# Patient Record
Sex: MALE | Race: WHITE | NOT HISPANIC OR LATINO | URBAN - METROPOLITAN AREA
[De-identification: names, ages, dates, MRNs, and addresses within clinical notes are randomized per-mention and may not be internally consistent; named-entity substitution may affect disease eponyms.]

---

## 2020-09-04 ENCOUNTER — OFFICE VISIT (OUTPATIENT)
Dept: INTERNAL MEDICINE | Facility: CLINIC | Age: 40
End: 2020-09-04
Payer: COMMERCIAL

## 2020-09-04 ENCOUNTER — LAB VISIT (OUTPATIENT)
Dept: LAB | Facility: HOSPITAL | Age: 40
End: 2020-09-04
Attending: FAMILY MEDICINE
Payer: COMMERCIAL

## 2020-09-04 VITALS
TEMPERATURE: 97 F | BODY MASS INDEX: 29.75 KG/M2 | HEIGHT: 70 IN | SYSTOLIC BLOOD PRESSURE: 122 MMHG | DIASTOLIC BLOOD PRESSURE: 86 MMHG | HEART RATE: 91 BPM | RESPIRATION RATE: 18 BRPM | WEIGHT: 207.81 LBS | OXYGEN SATURATION: 97 %

## 2020-09-04 DIAGNOSIS — G56.03 BILATERAL CARPAL TUNNEL SYNDROME: Primary | ICD-10-CM

## 2020-09-04 DIAGNOSIS — R30.0 DYSURIA: ICD-10-CM

## 2020-09-04 DIAGNOSIS — R81 GLUCOSURIA: ICD-10-CM

## 2020-09-04 LAB
AMORPH CRY UR QL COMP ASSIST: NORMAL
BILIRUB SERPL-MCNC: ABNORMAL MG/DL
BILIRUB UR QL STRIP: NEGATIVE
BLOOD URINE, POC: ABNORMAL
CLARITY UR REFRACT.AUTO: ABNORMAL
CLARITY, POC UA: ABNORMAL
COLOR UR AUTO: YELLOW
COLOR, POC UA: ABNORMAL
GLUCOSE UR QL STRIP: 250
GLUCOSE UR QL STRIP: ABNORMAL
HGB UR QL STRIP: NEGATIVE
KETONES UR QL STRIP: ABNORMAL
KETONES UR QL STRIP: NEGATIVE
LEUKOCYTE ESTERASE UR QL STRIP: NEGATIVE
LEUKOCYTE ESTERASE URINE, POC: ABNORMAL
MICROSCOPIC COMMENT: NORMAL
NITRITE UR QL STRIP: NEGATIVE
NITRITE, POC UA: ABNORMAL
PH UR STRIP: 5 [PH] (ref 5–8)
PH, POC UA: 5
PROT UR QL STRIP: NEGATIVE
PROTEIN, POC: ABNORMAL
SP GR UR STRIP: 1.03 (ref 1–1.03)
SPECIFIC GRAVITY, POC UA: 1.03
URN SPEC COLLECT METH UR: ABNORMAL
UROBILINOGEN, POC UA: ABNORMAL

## 2020-09-04 PROCEDURE — 99204 PR OFFICE/OUTPT VISIT, NEW, LEVL IV, 45-59 MIN: ICD-10-PCS | Mod: 25,S$GLB,, | Performed by: FAMILY MEDICINE

## 2020-09-04 PROCEDURE — 81002 POCT URINE DIPSTICK WITHOUT MICROSCOPE: ICD-10-PCS | Mod: S$GLB,,, | Performed by: FAMILY MEDICINE

## 2020-09-04 PROCEDURE — 3008F BODY MASS INDEX DOCD: CPT | Mod: CPTII,S$GLB,, | Performed by: FAMILY MEDICINE

## 2020-09-04 PROCEDURE — 99204 OFFICE O/P NEW MOD 45 MIN: CPT | Mod: 25,S$GLB,, | Performed by: FAMILY MEDICINE

## 2020-09-04 PROCEDURE — 36415 COLL VENOUS BLD VENIPUNCTURE: CPT | Mod: PO

## 2020-09-04 PROCEDURE — 99999 PR PBB SHADOW E&M-NEW PATIENT-LVL IV: CPT | Mod: PBBFAC,,, | Performed by: FAMILY MEDICINE

## 2020-09-04 PROCEDURE — 81002 URINALYSIS NONAUTO W/O SCOPE: CPT | Mod: S$GLB,,, | Performed by: FAMILY MEDICINE

## 2020-09-04 PROCEDURE — 83036 HEMOGLOBIN GLYCOSYLATED A1C: CPT

## 2020-09-04 PROCEDURE — 80048 BASIC METABOLIC PNL TOTAL CA: CPT

## 2020-09-04 PROCEDURE — 87491 CHLMYD TRACH DNA AMP PROBE: CPT

## 2020-09-04 PROCEDURE — 3008F PR BODY MASS INDEX (BMI) DOCUMENTED: ICD-10-PCS | Mod: CPTII,S$GLB,, | Performed by: FAMILY MEDICINE

## 2020-09-04 PROCEDURE — 81001 URINALYSIS AUTO W/SCOPE: CPT

## 2020-09-04 PROCEDURE — 99999 PR PBB SHADOW E&M-NEW PATIENT-LVL IV: ICD-10-PCS | Mod: PBBFAC,,, | Performed by: FAMILY MEDICINE

## 2020-09-04 RX ORDER — LORATADINE 10 MG/1
10 TABLET ORAL NIGHTLY
COMMUNITY

## 2020-09-04 NOTE — PROGRESS NOTES
"Subjective:       Patient ID: Gonzalo Mendez is a 40 y.o. male.    Chief Complaint: Carpal Tunnel (marleny wrist ) and Cough    HPI   Here today complaining of bilateral wrist pain and numbness that sometimes wakes him up at night.  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  Has tried cranberry juice without much in no penile discharge.  Never had previous issues with urinary tract infect    History reviewed. No pertinent family history.    Current Outpatient Medications:     loratadine (CLARITIN) 10 mg tablet, Take 10 mg by mouth every evening., Disp: , Rfl:     Review of Systems   Constitutional: Negative for activity change and unexpected weight change.   HENT: Negative for hearing loss, rhinorrhea and trouble swallowing.    Eyes: Negative for discharge and visual disturbance.   Respiratory: Negative for chest tightness and wheezing.    Cardiovascular: Negative for chest pain and palpitations.   Gastrointestinal: Negative for blood in stool, constipation, diarrhea and vomiting.   Endocrine: Negative for polydipsia and polyuria.   Genitourinary: Positive for dysuria. Negative for difficulty urinating, hematuria and urgency.   Musculoskeletal: Positive for arthralgias and joint swelling. Negative for neck pain.   Neurological: Negative for weakness and headaches.   Psychiatric/Behavioral: Negative for confusion and dysphoric mood.       Objective:   /86 (BP Location: Left arm, Patient Position: Sitting, BP Method: X-Large (Manual))   Pulse 91   Temp 96.6 °F (35.9 °C) (Temporal)   Resp 18   Ht 5' 10" (1.778 m)   Wt 94.3 kg (207 lb 12.5 oz)   SpO2 97%   BMI 29.81 kg/m²      Physical Exam  Vitals signs reviewed.   Constitutional:       Appearance: He is well-developed.   HENT:      Head: Normocephalic and atraumatic.   Eyes:      Conjunctiva/sclera: Conjunctivae normal.   Cardiovascular:      Rate and " Rhythm: Normal rate.   Pulmonary:      Effort: Pulmonary effort is normal. No respiratory distress.   Musculoskeletal:      Comments: Positive Tinel's and reverse Phalen's.   Skin:     General: Skin is warm and dry.      Findings: No rash.   Neurological:      Mental Status: He is alert and oriented to person, place, and time.      Coordination: Coordination normal.   Psychiatric:         Behavior: Behavior normal.         Assessment & Plan     Problem List Items Addressed This Visit        Neuro    Bilateral carpal tunnel syndrome - Primary    Current Assessment & Plan     Due to severity of symptoms as well as positive Tinel's and reverse Phalen's will refer to orthopedics.         Relevant Orders    Ambulatory referral/consult to Orthopedics       Renal/    Dysuria    Current Assessment & Plan     Checking gonorrhea chlamydia and also sending for complete urinalysis         Relevant Orders    C. trachomatis/N. gonorrhoeae by AMP DNA    POCT URINE DIPSTICK WITHOUT MICROSCOPE (Completed)    URINALYSIS       Endocrine    Glucosuria    Current Assessment & Plan     Falling of urine dip with basic metabolic panel and A1c.         Relevant Orders    Basic metabolic panel    Hemoglobin A1C            No follow-ups on file.    Disclaimer:  This note may have been prepared using voice recognition software, it may have not been extensively proofed, as such there could be errors within the text such as sound alike errors.

## 2020-09-04 NOTE — ASSESSMENT & PLAN NOTE
Due to severity of symptoms as well as positive Tinel's and reverse Phalen's will refer to orthopedics.

## 2020-09-05 LAB
ANION GAP SERPL CALC-SCNC: 5 MMOL/L (ref 8–16)
BUN SERPL-MCNC: 15 MG/DL (ref 6–20)
CALCIUM SERPL-MCNC: 9.2 MG/DL (ref 8.7–10.5)
CHLORIDE SERPL-SCNC: 107 MMOL/L (ref 95–110)
CO2 SERPL-SCNC: 29 MMOL/L (ref 23–29)
CREAT SERPL-MCNC: 0.9 MG/DL (ref 0.5–1.4)
EST. GFR  (AFRICAN AMERICAN): >60 ML/MIN/1.73 M^2
EST. GFR  (NON AFRICAN AMERICAN): >60 ML/MIN/1.73 M^2
ESTIMATED AVG GLUCOSE: 111 MG/DL (ref 68–131)
GLUCOSE SERPL-MCNC: 100 MG/DL (ref 70–110)
HBA1C MFR BLD HPLC: 5.5 % (ref 4–5.6)
POTASSIUM SERPL-SCNC: 4.1 MMOL/L (ref 3.5–5.1)
SODIUM SERPL-SCNC: 141 MMOL/L (ref 136–145)

## 2020-09-11 DIAGNOSIS — G56.03 BILATERAL CARPAL TUNNEL SYNDROME: Primary | ICD-10-CM

## 2020-09-15 ENCOUNTER — OFFICE VISIT (OUTPATIENT)
Dept: ORTHOPEDICS | Facility: CLINIC | Age: 40
End: 2020-09-15
Payer: MEDICAID

## 2020-09-15 ENCOUNTER — HOSPITAL ENCOUNTER (OUTPATIENT)
Dept: RADIOLOGY | Facility: HOSPITAL | Age: 40
Discharge: HOME | End: 2020-09-15
Attending: FAMILY MEDICINE
Payer: COMMERCIAL

## 2020-09-15 DIAGNOSIS — M77.12 LEFT LATERAL EPICONDYLITIS: Primary | ICD-10-CM

## 2020-09-15 DIAGNOSIS — G56.03 BILATERAL CARPAL TUNNEL SYNDROME: ICD-10-CM

## 2020-09-15 PROCEDURE — 73130 X-RAY EXAM OF HAND: CPT | Mod: TC,50

## 2020-09-15 PROCEDURE — 20605 INTERMEDIATE JOINT ASPIRATION/INJECTION: ICD-10-PCS | Mod: S$PBB,LT,, | Performed by: FAMILY MEDICINE

## 2020-09-15 PROCEDURE — 73130 X-RAY EXAM OF HAND: CPT | Mod: 26,LT,, | Performed by: RADIOLOGY

## 2020-09-15 PROCEDURE — 20605 DRAIN/INJ JOINT/BURSA W/O US: CPT | Mod: PBBFAC | Performed by: FAMILY MEDICINE

## 2020-09-15 PROCEDURE — 99213 OFFICE O/P EST LOW 20 MIN: CPT | Mod: PBBFAC,25 | Performed by: FAMILY MEDICINE

## 2020-09-15 PROCEDURE — 73110 X-RAY EXAM OF WRIST: CPT | Mod: 26,RT,, | Performed by: RADIOLOGY

## 2020-09-15 PROCEDURE — 99214 OFFICE O/P EST MOD 30 MIN: CPT | Mod: 25,S$PBB,, | Performed by: FAMILY MEDICINE

## 2020-09-15 PROCEDURE — 99214 PR OFFICE/OUTPT VISIT, EST, LEVL IV, 30-39 MIN: ICD-10-PCS | Mod: 25,S$PBB,, | Performed by: FAMILY MEDICINE

## 2020-09-15 PROCEDURE — 73130 X-RAY EXAM OF HAND: CPT | Mod: 26,RT,, | Performed by: RADIOLOGY

## 2020-09-15 PROCEDURE — 73110 X-RAY EXAM OF WRIST: CPT | Mod: TC,50

## 2020-09-15 PROCEDURE — 73110 X-RAY EXAM OF WRIST: CPT | Mod: 26,LT,, | Performed by: RADIOLOGY

## 2020-09-15 PROCEDURE — 73110 PR  X-RAY WRIST 3+ VW: ICD-10-PCS | Mod: 26,LT,, | Performed by: RADIOLOGY

## 2020-09-15 PROCEDURE — 73130 PR  X-RAY HAND 3+ VW: ICD-10-PCS | Mod: 26,LT,, | Performed by: RADIOLOGY

## 2020-09-15 PROCEDURE — 99999 PR PBB SHADOW E&M-EST. PATIENT-LVL III: CPT | Mod: PBBFAC,,, | Performed by: FAMILY MEDICINE

## 2020-09-15 PROCEDURE — 99999 PR PBB SHADOW E&M-EST. PATIENT-LVL III: ICD-10-PCS | Mod: PBBFAC,,, | Performed by: FAMILY MEDICINE

## 2020-09-15 RX ORDER — METHYLPREDNISOLONE 4 MG/1
TABLET ORAL
Qty: 1 PACKAGE | Refills: 0 | Status: SHIPPED | OUTPATIENT
Start: 2020-09-15 | End: 2022-09-22

## 2020-09-15 RX ORDER — BETAMETHASONE SODIUM PHOSPHATE AND BETAMETHASONE ACETATE 3; 3 MG/ML; MG/ML
6 INJECTION, SUSPENSION INTRA-ARTICULAR; INTRALESIONAL; INTRAMUSCULAR; SOFT TISSUE
Status: DISCONTINUED | OUTPATIENT
Start: 2020-09-15 | End: 2020-09-15 | Stop reason: HOSPADM

## 2020-09-15 RX ADMIN — BETAMETHASONE ACETATE AND BETAMETHASONE SODIUM PHOSPHATE 6 MG: 3; 3 INJECTION, SUSPENSION INTRA-ARTICULAR; INTRALESIONAL; INTRAMUSCULAR; SOFT TISSUE at 03:09

## 2020-09-15 NOTE — PATIENT INSTRUCTIONS
Apply ice to affected area three times a day for (15) fifteen minutes for the next 48 hours, and reduce activity during that time.    Voltaren gel three times a day to affected area if recommended.    Oral medication if recommended.  Physical therapy if recommended at home or at clinic.  Keep recheck visit.      Nerve conduction study and then follow-up with Dr. Perea

## 2020-09-15 NOTE — PROCEDURES
L lateral epicondyleIntermediate Joint Aspiration/Injection    Date/Time: 9/15/2020 3:30 PM  Performed by: Chun Damian MD  Authorized by: Chun Damain MD     Indications: Pain  Site marked: The procedure site was marked    Timeout: Prior to procedure the correct patient, procedure, and site was verified      Location:  Elbow  Prep: Patient was prepped and draped in usual sterile fashion    Approach:  Anterolateral  Medications:  6 mg betamethasone acetate-betamethasone sodium phosphate 6 mg/mL  Patient tolerance:  Patient tolerated the procedure well with no immediate complications

## 2020-09-15 NOTE — LETTER
September 15, 2020      Baron Henson,   [address] [address] [address]           [facility]  [id number] [place] [address]  Phone: [phone]  Fax: [phone]          Patient: Gonzalo Mendez   MR Number: [id number]   YOB: 1980   Date of Visit: 9/15/2020       Dear Dr. Baron Henson:    Thank you for referring Gonzalo Mendez to me for evaluation. Attached you will find relevant portions of my assessment and plan of care.    If you have questions, please do not hesitate to call me. I look forward to following Gonzalo Mendez along with you.    Sincerely,    Chun Damian MD    Enclosure  CC:  No Recipients    If you would like to receive this communication electronically, please contact [email or web address] or [phone] to request more information on [facility] [employer] access.    For providers and/or their staff who would like to refer a patient to [facility], please contact us through our one-stop-shop provider referral line, [facility] , at [phone].    If you feel you have received this communication in error or would no longer like to receive these types of communications, please e-mail [email or web address]

## 2020-09-15 NOTE — PROGRESS NOTES
Subjective:     Patient ID: Gonzalo Mendez is a 40 y.o. male.    Chief Complaint: Pain and Numbness of the Right Wrist and Pain and Numbness of the Left Wrist      HPI:  Problem - pain and numbness into hands for several years in worse for the last few months.  Did weed eating 5 days a week for several years and thinks this may have started the problem.  Also has some pain around the left elbow region.    Duration - months    Severity -    Pain - 6 /10   Limitations - affects daily activities    Previous Treatment - Advil and Aleve but tries to keep this minimal since he took it for about 20 years.  Currently out of work.    Other Information - mother is diabetic and patient had glucosuria and asked me to check his results on his A1c and blood sugar and these were normal.  Has bilateral wrist splints and wears them p.r.n..  Would be willing to have surgical repair if needed for his wrist.      History reviewed. No pertinent past medical history.  History reviewed. No pertinent surgical history.  History reviewed. No pertinent family history.  Social History     Socioeconomic History    Marital status:      Spouse name: Not on file    Number of children: Not on file    Years of education: Not on file    Highest education level: Not on file   Occupational History    Not on file   Social Needs    Financial resource strain: Somewhat hard    Food insecurity     Worry: Never true     Inability: Never true    Transportation needs     Medical: No     Non-medical: No   Tobacco Use    Smoking status: Never Smoker    Smokeless tobacco: Current User     Types: Snuff   Substance and Sexual Activity    Alcohol use: Yes     Frequency: 2-4 times a month     Drinks per session: 1 or 2     Binge frequency: Never    Drug use: Never    Sexual activity: Yes   Lifestyle    Physical activity     Days per week: 5 days     Minutes per session: 30 min    Stress: Only a little   Relationships    Social  connections     Talks on phone: Three times a week     Gets together: Once a week     Attends Gnosticist service: Not on file     Active member of club or organization: No     Attends meetings of clubs or organizations: Never     Relationship status:    Other Topics Concern    Not on file   Social History Narrative    Not on file       Current Outpatient Medications:     loratadine (CLARITIN) 10 mg tablet, Take 10 mg by mouth every evening., Disp: , Rfl:   Review of patient's allergies indicates:  No Known Allergies  Review of Systems   Constitutional: Negative for chills, fever and weight loss.   Respiratory: Negative for shortness of breath.    Cardiovascular: Negative for chest pain and palpitations.       Objective:   There is no height or weight on file to calculate BMI.  There were no vitals filed for this visit.        Ortho/SPM Exam  General - A&O, NAD.     Respiratory Effort - Normal    Extremity (Body Part) - wrist and left elbow     -No acute deformity/swelling-trace edema left lateral elbow extensor mass    ROM - full range of motion of the wrist and left elbow    TTP - point tenderness to palpation at the left lateral epicondyle and worsened by resisted dorsiflexion of the wrist.  No tenderness to palpation of the wrist.    Stability -good, no excessive laxity  Strength - 4 to 5/5 bilateral    Neurovascular Intact - positive Tinel and Phalen test on the right  Negative Tinel and Phalen on left wrist    Note-with these maneuvers the patient complains of a burning type pain and numbness he feels in the entire hand.    Other -     Psychiatric - Affect & Cognition WNL      Plan for Improvement - cortisone injection today to left lateral epicondyle to help with tennis elbow symptoms.  Prednisone by mouth for few days to reduce inflammation hopefully around the median nerves and reduce is hand and wrist symptoms.    Will order nerve conduction studies and referral to hand ortho.      IMAGING: No  image results found.       Radiographs / Imaging : Relevant imaging results reviewed by me and interpreted by me, discussed with the patient and / or family -radiographs reviewed by me and agree with radiologist interpretation of no acute fracture dislocation and minimal degenerative changes.      Assessment:     Encounter Diagnoses   Name Primary?    Bilateral carpal tunnel syndrome     Left lateral epicondylitis Yes        Plan:   Left lateral epicondylitis  -     Intermediate Joint Aspiration/Injection    Bilateral carpal tunnel syndrome  -     Ambulatory referral/consult to Orthopedics  -     EMG W/ ULTRASOUND AND NERVE CONDUCTION TEST 2 Extremities; Future        The patient verbalized good understanding of the medical issues discussed today and expressed appreciation for the care provided.  Patient was given the opportunity to ask questions and be an active participant in their medical care. Patient had no further questions or concerns at this time.     The patient was encouraged to participate in appropriate physical activity.      Chun Damian M.D.  [facility]        This note was dictated using voice recognition software and may have sound a like errors.

## 2020-09-22 ENCOUNTER — PATIENT MESSAGE (OUTPATIENT)
Dept: ORTHOPEDICS | Facility: CLINIC | Age: 40
End: 2020-09-22

## 2020-09-22 ENCOUNTER — TELEPHONE (OUTPATIENT)
Dept: ORTHOPEDICS | Facility: CLINIC | Age: 40
End: 2020-09-22

## 2020-09-28 ENCOUNTER — PATIENT MESSAGE (OUTPATIENT)
Dept: ORTHOPEDICS | Facility: CLINIC | Age: 40
End: 2020-09-28

## 2020-10-12 ENCOUNTER — PATIENT MESSAGE (OUTPATIENT)
Dept: ORTHOPEDICS | Facility: CLINIC | Age: 40
End: 2020-10-12

## 2020-10-12 ENCOUNTER — TELEPHONE (OUTPATIENT)
Dept: ORTHOPEDICS | Facility: CLINIC | Age: 40
End: 2020-10-12

## 2021-02-19 ENCOUNTER — TELEPHONE (OUTPATIENT)
Dept: INTERNAL MEDICINE | Facility: CLINIC | Age: 41
End: 2021-02-19

## 2022-02-21 ENCOUNTER — TELEPHONE (OUTPATIENT)
Dept: INTERNAL MEDICINE | Facility: CLINIC | Age: 42
End: 2022-02-21
Payer: MEDICAID

## 2022-02-21 NOTE — TELEPHONE ENCOUNTER
----- Message from Gema Marroquin sent at 2/21/2022  4:36 PM CST -----  Regarding: pt spouse called  Name of Who is Calling: JIMMY ROBIN [[id number]] yani      What is the request in detail: pt is requesting an appt for 02/22/2022 for pain in his arm. Please advise      Can the clinic reply by [facility]: No      [facility] [phone]       Resident

## 2022-02-21 NOTE — TELEPHONE ENCOUNTER
Spoke with patients wife, Amparo.  She was looking for a primary doctor that could see her  tomorrow but accidentially call Central office.  Was looking for someone in the [facility].

## 2022-02-23 DIAGNOSIS — M79.602 LEFT ARM PAIN: Primary | ICD-10-CM

## 2022-03-03 ENCOUNTER — OFFICE VISIT (OUTPATIENT)
Dept: SPORTS MEDICINE | Facility: CLINIC | Age: 42
End: 2022-03-03
Payer: MEDICAID

## 2022-03-03 ENCOUNTER — HOSPITAL ENCOUNTER (OUTPATIENT)
Dept: RADIOLOGY | Facility: HOSPITAL | Age: 42
Discharge: HOME | End: 2022-03-03
Attending: FAMILY MEDICINE
Payer: MEDICAID

## 2022-03-03 VITALS — BODY MASS INDEX: 29.76 KG/M2 | WEIGHT: 207.88 LBS | HEIGHT: 70 IN

## 2022-03-03 DIAGNOSIS — M77.12 LEFT LATERAL EPICONDYLITIS: Primary | ICD-10-CM

## 2022-03-03 DIAGNOSIS — M79.602 LEFT ARM PAIN: ICD-10-CM

## 2022-03-03 PROCEDURE — 99214 PR OFFICE/OUTPT VISIT, EST, LEVL IV, 30-39 MIN: ICD-10-PCS | Mod: 25,S$PBB,, | Performed by: FAMILY MEDICINE

## 2022-03-03 PROCEDURE — 99999 PR PBB SHADOW E&M-EST. PATIENT-LVL III: ICD-10-PCS | Mod: PBBFAC,,, | Performed by: FAMILY MEDICINE

## 2022-03-03 PROCEDURE — 73030 XR SHOULDER COMPLETE 2 OR MORE VIEWS LEFT: ICD-10-PCS | Mod: 26,LT,, | Performed by: RADIOLOGY

## 2022-03-03 PROCEDURE — 73030 X-RAY EXAM OF SHOULDER: CPT | Mod: 26,LT,, | Performed by: RADIOLOGY

## 2022-03-03 PROCEDURE — 73060 XR HUMERUS 2 VIEW LEFT: ICD-10-PCS | Mod: 26,LT,, | Performed by: RADIOLOGY

## 2022-03-03 PROCEDURE — 73030 X-RAY EXAM OF SHOULDER: CPT | Mod: TC,LT

## 2022-03-03 PROCEDURE — 1159F PR MEDICATION LIST DOCUMENTED IN MEDICAL RECORD: ICD-10-PCS | Mod: CPTII,,, | Performed by: FAMILY MEDICINE

## 2022-03-03 PROCEDURE — 73060 X-RAY EXAM OF HUMERUS: CPT | Mod: TC,LT

## 2022-03-03 PROCEDURE — 20605 DRAIN/INJ JOINT/BURSA W/O US: CPT | Mod: PBBFAC | Performed by: FAMILY MEDICINE

## 2022-03-03 PROCEDURE — 99999 PR PBB SHADOW E&M-EST. PATIENT-LVL III: CPT | Mod: PBBFAC,,, | Performed by: FAMILY MEDICINE

## 2022-03-03 PROCEDURE — 3008F BODY MASS INDEX DOCD: CPT | Mod: CPTII,,, | Performed by: FAMILY MEDICINE

## 2022-03-03 PROCEDURE — 3008F PR BODY MASS INDEX (BMI) DOCUMENTED: ICD-10-PCS | Mod: CPTII,,, | Performed by: FAMILY MEDICINE

## 2022-03-03 PROCEDURE — 20605 INTERMEDIATE JOINT ASPIRATION/INJECTION: L ELBOW: ICD-10-PCS | Mod: S$PBB,LT,, | Performed by: FAMILY MEDICINE

## 2022-03-03 PROCEDURE — 99213 OFFICE O/P EST LOW 20 MIN: CPT | Mod: PBBFAC,25 | Performed by: FAMILY MEDICINE

## 2022-03-03 PROCEDURE — 99214 OFFICE O/P EST MOD 30 MIN: CPT | Mod: 25,S$PBB,, | Performed by: FAMILY MEDICINE

## 2022-03-03 PROCEDURE — 73060 X-RAY EXAM OF HUMERUS: CPT | Mod: 26,LT,, | Performed by: RADIOLOGY

## 2022-03-03 PROCEDURE — 1159F MED LIST DOCD IN RCRD: CPT | Mod: CPTII,,, | Performed by: FAMILY MEDICINE

## 2022-03-03 RX ORDER — ACETAMINOPHEN 500 MG
TABLET ORAL
COMMUNITY

## 2022-03-03 RX ORDER — BETAMETHASONE SODIUM PHOSPHATE AND BETAMETHASONE ACETATE 3; 3 MG/ML; MG/ML
6 INJECTION, SUSPENSION INTRA-ARTICULAR; INTRALESIONAL; INTRAMUSCULAR; SOFT TISSUE
Status: DISCONTINUED | OUTPATIENT
Start: 2022-03-03 | End: 2022-03-03 | Stop reason: HOSPADM

## 2022-03-03 RX ADMIN — BETAMETHASONE SODIUM PHOSPHATE AND BETAMETHASONE ACETATE 6 MG: 3; 3 INJECTION, SUSPENSION INTRA-ARTICULAR; INTRALESIONAL; INTRAMUSCULAR at 04:03

## 2022-03-03 NOTE — PATIENT INSTRUCTIONS
Ice for 15 minutes 3 times a day over the next 2 days and take 2 ibuprofen to 3 times a day as needed    Start physical therapy    Recheck here in about 4 weeks

## 2022-03-03 NOTE — PROGRESS NOTES
Subjective:     Patient ID: Gonzalo Mendez is a 42 y.o. male.    Chief Complaint: Pain of the Left Elbow      HPI:  Patient with recurrent left lateral epicondylitis several times through the years mostly work related.    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  His side to come back today to get another cortisone injection since this did work very well for him a year to ago.  His will recent work required him to do constant overhand lifting and pulling which we had warned him about previously.    He says his pain a got to the point that he sometimes was dropping objects because of the spontaneous pain and weakness.    No past medical history on file.  No past surgical history on file.  No family history on file.  Social History     Socioeconomic History    Marital status:    Tobacco Use    Smoking status: Never Smoker    Smokeless tobacco: Current User     Types: Snuff   Substance and Sexual Activity    Alcohol use: Yes    Drug use: Never    Sexual activity: Yes       Current Outpatient Medications:     acetaminophen (TYLENOL) 500 MG tablet, Tylenol Take No date recorded No form recorded No frequency recorded No route recorded No set duration recorded No set duration amount recorded suspended No dosage strength recorded No dosage strength units of measure recorded, Disp: , Rfl:     loratadine (CLARITIN) 10 mg tablet, Take 10 mg by mouth every evening., Disp: , Rfl:     methylPREDNISolone (MEDROL DOSEPACK) 4 mg tablet, use as directed (Patient not taking: Reported on 3/3/2022), Disp: 1 Package, Rfl: 0  Review of patient's allergies indicates:  No Known Allergies  Review of Systems   Constitutional: Negative for chills, fever and weight loss.   Respiratory: Negative for shortness of breath.    Cardiovascular: Negative for chest pain and palpitations.        Objective:   Body mass index is 29.83 kg/m².  There were no vitals filed for this visit.        Ortho/SPM Exam-alert and oriented well-nourished well-developed ambulatory no acute distress    Respiratory effort is normal    Left elbow-no acute deformity swelling or redness    Full range of motion    Point tender posterior aspect of lateral epicondylitis and pain greatly exacerbated resisted dorsiflexion at wrist    Left  strength is 3 to 4/5    Neurovascular intact    Psychiatric good affect cognition    Plan-cortisone injection today.  Patient will be sent to physical therapy for optimal treatment.  He has discontinued the aggravating work conditions.    Recheck here about 4 weeks we will see if 2nd injection necessary.    I have also warned him that if this problem continues he will probably have to have surgical treatment for this.  He can try forearm band    Patient Instructions   Ice for 15 minutes 3 times a day over the next 2 days and take 2 ibuprofen to 3 times a day as needed    Start physical therapy    Recheck here in about 4 weeks      IMAGING: X-Ray Humerus 2 View Left  Narrative: EXAMINATION:  XR HUMERUS 2 VIEW LEFT    CLINICAL HISTORY:  Pain in left arm    TECHNIQUE:  AP/lateral views    COMPARISON:  None    FINDINGS:  No acute osseous or soft tissue abnormality.  Impression: As above    Electronically signed by: Adelso Maciel MD  Date:    03/03/2022  Time:    16:03  X-Ray Shoulder 2 or More Views Left  Narrative: EXAMINATION:  XR SHOULDER COMPLETE 2 OR MORE VIEWS LEFT    CLINICAL HISTORY:  Pain in left arm    TECHNIQUE:  Two or three views of the left shoulder were performed.    COMPARISON:  None    FINDINGS:  No acute osseous or soft tissue abnormality.  No significant degenerative findings.  Impression: As above    Electronically signed by: Adelso Maciel MD  Date:    03/03/2022  Time:    16:03       Radiographs / Imaging : Relevant imaging results reviewed by me and interpreted by  me, discussed with the patient and / or family -x-ray reviewed by me and agree with report      Assessment:     Encounter Diagnosis   Name Primary?    Left lateral epicondylitis Yes        Plan:   Left lateral epicondylitis  -     Intermediate Joint Aspiration/Injection: L elbow        The patient verbalized good understanding of the medical issues discussed today and expressed appreciation for the care provided.  Patient was given the opportunity to ask questions and be an active participant in their medical care. Patient had no further questions or concerns at this time.     The patient was encouraged to participate in appropriate physical activity.      Chun Damian M.D.  [facility]        This note was dictated using voice recognition software and may have sound a like errors.

## 2022-03-03 NOTE — PROCEDURES
L lateral epicondyleIntermediate Joint Aspiration/Injection: L elbow    Date/Time: 3/3/2022 4:00 PM  Performed by: Chun Damian MD  Authorized by: Chun Damian MD     Indications: Pain  Site marked: The procedure site was marked    Timeout: Prior to procedure the correct patient, procedure, and site was verified      Location:  Elbow  Site:  L elbow  Prep: Patient was prepped and draped in usual sterile fashion    Approach:  Anterolateral  Medications:  6 mg betamethasone acetate-betamethasone sodium phosphate 6 mg/mL  Patient tolerance:  Patient tolerated the procedure well with no immediate complications

## 2022-03-04 DIAGNOSIS — M25.522 LEFT ELBOW PAIN: Primary | ICD-10-CM

## 2022-04-21 ENCOUNTER — TELEPHONE (OUTPATIENT)
Dept: SPORTS MEDICINE | Facility: CLINIC | Age: 42
End: 2022-04-21
Payer: MEDICAID

## 2022-04-28 ENCOUNTER — TELEPHONE (OUTPATIENT)
Dept: SPORTS MEDICINE | Facility: CLINIC | Age: 42
End: 2022-04-28
Payer: MEDICAID

## 2022-05-18 ENCOUNTER — TELEPHONE (OUTPATIENT)
Dept: SPORTS MEDICINE | Facility: CLINIC | Age: 42
End: 2022-05-18
Payer: MEDICAID

## 2022-05-18 NOTE — TELEPHONE ENCOUNTER
----- Message from Lyly Ellison MA sent at 5/17/2022  4:56 PM CDT -----  Contact: Shu, Wife    ----- Message -----  From: Jesica Ramirez  Sent: 5/17/2022   2:28 PM CDT  To: Nati Mccollum Staff    Shu called regarding scheduling an appointment for Gonzalo for L elbow pain, please give her a call back at [phone]      Thanks  Kb

## 2022-09-19 ENCOUNTER — TELEPHONE (OUTPATIENT)
Dept: INTERNAL MEDICINE | Facility: CLINIC | Age: 42
End: 2022-09-19
Payer: MEDICAID

## 2022-09-19 NOTE — TELEPHONE ENCOUNTER
I called the pt and assisted him with scheduling an appt regarding rt index finger pain and swelling and an est care appt . //kah

## 2022-09-19 NOTE — TELEPHONE ENCOUNTER
----- Message from Valeria Tolliver sent at 9/19/2022  2:01 PM CDT -----  Contact: [phone]  Type:  Sooner Apoointment Request    Caller is requesting a sooner appointment.  Caller declined first available appointment listed below.  Caller will not accept being placed on the waitlist and is requesting a message be sent to doctor.  Name of Caller:Wife   When is the first available appointment? 11/2022   Symptoms:est/ right index finger swollen   Would the patient rather a call back or a response via [facility]? Call back   Best Call Back Number: [phone]  Additional Information:

## 2022-09-22 ENCOUNTER — OFFICE VISIT (OUTPATIENT)
Dept: INTERNAL MEDICINE | Facility: CLINIC | Age: 42
End: 2022-09-22
Payer: MEDICAID

## 2022-09-22 VITALS
RESPIRATION RATE: 15 BRPM | SYSTOLIC BLOOD PRESSURE: 128 MMHG | TEMPERATURE: 98 F | WEIGHT: 212 LBS | BODY MASS INDEX: 30.41 KG/M2 | OXYGEN SATURATION: 96 % | HEART RATE: 97 BPM | DIASTOLIC BLOOD PRESSURE: 84 MMHG

## 2022-09-22 DIAGNOSIS — M79.644 FINGER PAIN, RIGHT: Primary | ICD-10-CM

## 2022-09-22 DIAGNOSIS — R81 GLUCOSURIA: ICD-10-CM

## 2022-09-22 DIAGNOSIS — G57.93 NEUROPATHIC PAIN OF BOTH FEET: ICD-10-CM

## 2022-09-22 PROCEDURE — 3079F PR MOST RECENT DIASTOLIC BLOOD PRESSURE 80-89 MM HG: ICD-10-PCS | Mod: CPTII,,, | Performed by: NURSE PRACTITIONER

## 2022-09-22 PROCEDURE — 99204 OFFICE O/P NEW MOD 45 MIN: CPT | Mod: S$PBB,,, | Performed by: NURSE PRACTITIONER

## 2022-09-22 PROCEDURE — 99204 PR OFFICE/OUTPT VISIT, NEW, LEVL IV, 45-59 MIN: ICD-10-PCS | Mod: S$PBB,,, | Performed by: NURSE PRACTITIONER

## 2022-09-22 PROCEDURE — 99999 PR PBB SHADOW E&M-EST. PATIENT-LVL IV: ICD-10-PCS | Mod: PBBFAC,,, | Performed by: NURSE PRACTITIONER

## 2022-09-22 PROCEDURE — 3008F PR BODY MASS INDEX (BMI) DOCUMENTED: ICD-10-PCS | Mod: CPTII,,, | Performed by: NURSE PRACTITIONER

## 2022-09-22 PROCEDURE — 99214 OFFICE O/P EST MOD 30 MIN: CPT | Mod: PBBFAC,PN | Performed by: NURSE PRACTITIONER

## 2022-09-22 PROCEDURE — 1159F MED LIST DOCD IN RCRD: CPT | Mod: CPTII,,, | Performed by: NURSE PRACTITIONER

## 2022-09-22 PROCEDURE — 1160F RVW MEDS BY RX/DR IN RCRD: CPT | Mod: CPTII,,, | Performed by: NURSE PRACTITIONER

## 2022-09-22 PROCEDURE — 3074F PR MOST RECENT SYSTOLIC BLOOD PRESSURE < 130 MM HG: ICD-10-PCS | Mod: CPTII,,, | Performed by: NURSE PRACTITIONER

## 2022-09-22 PROCEDURE — 1159F PR MEDICATION LIST DOCUMENTED IN MEDICAL RECORD: ICD-10-PCS | Mod: CPTII,,, | Performed by: NURSE PRACTITIONER

## 2022-09-22 PROCEDURE — 1160F PR REVIEW ALL MEDS BY PRESCRIBER/CLIN PHARMACIST DOCUMENTED: ICD-10-PCS | Mod: CPTII,,, | Performed by: NURSE PRACTITIONER

## 2022-09-22 PROCEDURE — 3008F BODY MASS INDEX DOCD: CPT | Mod: CPTII,,, | Performed by: NURSE PRACTITIONER

## 2022-09-22 PROCEDURE — 3074F SYST BP LT 130 MM HG: CPT | Mod: CPTII,,, | Performed by: NURSE PRACTITIONER

## 2022-09-22 PROCEDURE — 99999 PR PBB SHADOW E&M-EST. PATIENT-LVL IV: CPT | Mod: PBBFAC,,, | Performed by: NURSE PRACTITIONER

## 2022-09-22 PROCEDURE — 3079F DIAST BP 80-89 MM HG: CPT | Mod: CPTII,,, | Performed by: NURSE PRACTITIONER

## 2022-09-22 RX ORDER — MELOXICAM 15 MG/1
15 TABLET ORAL DAILY PRN
Qty: 30 TABLET | Refills: 0 | Status: SHIPPED | OUTPATIENT
Start: 2022-09-22

## 2022-09-22 RX ORDER — PREDNISONE 20 MG/1
20 TABLET ORAL DAILY
Qty: 5 TABLET | Refills: 0 | Status: SHIPPED | OUTPATIENT
Start: 2022-09-22

## 2022-09-22 NOTE — PROGRESS NOTES
Subjective:       Patient ID: Gonzalo Mendez is a 42 y.o. male.    Chief Complaint: Finger Pain (R index finger pain, chronic. Ble swelling )    Patient presents with right index finger pain and swelling.  Started about 3 years ago after an incident.  Reports a screw was drilled in finger.      Reports itching to bottom on feet.  Has history of plantar fasciitis.  Reports tingling/burning.      Finger Pain  Associated symptoms include arthralgias (bilateral feet/right index finger). Pertinent negatives include no chills, coughing, fatigue or fever.   Review of Systems   Constitutional:  Negative for chills, fatigue and fever.   Respiratory:  Negative for cough and shortness of breath.    Musculoskeletal:  Positive for arthralgias (bilateral feet/right index finger). Negative for back pain.   Psychiatric/Behavioral:  Negative for agitation and confusion.        Objective:      Physical Exam  Vitals reviewed.   Constitutional:       Appearance: Normal appearance.   HENT:      Head: Normocephalic.   Cardiovascular:      Rate and Rhythm: Normal rate and regular rhythm.   Pulmonary:      Effort: Pulmonary effort is normal.      Breath sounds: Normal breath sounds.   Musculoskeletal:         General: Swelling, tenderness and signs of injury present.      Right hand: Swelling, tenderness and bony tenderness present. Decreased range of motion.      Comments: Right middle phalanx.    Skin:     General: Skin is warm.   Neurological:      General: No focal deficit present.      Mental Status: He is alert and oriented to person, place, and time.   Psychiatric:         Mood and Affect: Mood normal.         Behavior: Behavior normal. Behavior is cooperative.       Assessment:       Problem List Items Addressed This Visit       Glucosuria    Relevant Orders    Urinalysis     Other Visit Diagnoses       Finger pain, right    -  Primary    Relevant Orders    X-Ray Finger 2 or More Views Right (Completed)    Ambulatory  referral/consult to Orthopedics    Neuropathic pain of both feet        Relevant Medications    predniSONE (DELTASONE) 20 MG tablet    meloxicam (MOBIC) 15 MG tablet    Other Relevant Orders    HEMOGLOBIN A1C    Comprehensive Metabolic Panel    CBC Auto Differential    TSH    URIC ACID            Plan:           Finger pain, right  -     X-Ray Finger 2 or More Views Right; Future; Expected date: 09/22/2022  -     Ambulatory referral/consult to Orthopedics; Future; Expected date: 09/30/2022    Neuropathic pain of both feet  -     HEMOGLOBIN A1C; Future; Expected date: 09/22/2022  -     Comprehensive Metabolic Panel; Future; Expected date: 09/22/2022  -     CBC Auto Differential; Future; Expected date: 09/22/2022  -     TSH; Future; Expected date: 09/22/2022  -     URIC ACID; Future; Expected date: 09/22/2022  -     predniSONE (DELTASONE) 20 MG tablet; Take 1 tablet (20 mg total) by mouth once daily.  Dispense: 5 tablet; Refill: 0  -     meloxicam (MOBIC) 15 MG tablet; Take 1 tablet (15 mg total) by mouth daily as needed for Pain.  Dispense: 30 tablet; Refill: 0    Glucosuria  -     Urinalysis; Future; Expected date: 09/22/2022       Labs and x-ray today     Finger splint     Ortho referral.

## 2022-09-22 NOTE — LETTER
September 22, 2022    Gonzalo Mendez  [address] [place] [address]             [facility]  Internal Medicine  [address] [place] [address]  [place] [address]  Phone: [phone]  Fax: [phone]   September 22, 2022     Patient: Gonzalo Mendez   YOB: 1980   Date of Visit: 9/22/2022       To Whom it May Concern:    Gonzalo Mendez was seen in my clinic on 9/22/2022. He may return to work on 09/23/2022.    Please excuse him from any work missed.    If you have any questions or concerns, please don't hesitate to call.    Sincerely,         Amara Granados NP

## 2022-10-13 DIAGNOSIS — M79.641 RIGHT HAND PAIN: Primary | ICD-10-CM

## 2022-10-19 ENCOUNTER — TELEPHONE (OUTPATIENT)
Dept: INTERNAL MEDICINE | Facility: CLINIC | Age: 42
End: 2022-10-19
Payer: MEDICAID

## 2022-10-19 NOTE — TELEPHONE ENCOUNTER
I called the pt at 7:24 am today to ask if he was still coming to his 7 am appt with Amara PICKERING and the pt's wife stated he was working today and asked to reschedule . I assisted with rescheduling . She verbalized understanding of new appt date and time , location. //kah

## 2023-02-01 ENCOUNTER — PATIENT MESSAGE (OUTPATIENT)
Dept: ADMINISTRATIVE | Facility: HOSPITAL | Age: 43
End: 2023-02-01
Payer: MEDICAID